# Patient Record
Sex: FEMALE | Race: WHITE | NOT HISPANIC OR LATINO | URBAN - METROPOLITAN AREA
[De-identification: names, ages, dates, MRNs, and addresses within clinical notes are randomized per-mention and may not be internally consistent; named-entity substitution may affect disease eponyms.]

---

## 2017-02-02 ENCOUNTER — OUTPATIENT (OUTPATIENT)
Dept: OUTPATIENT SERVICES | Facility: HOSPITAL | Age: 57
LOS: 1 days | Discharge: HOME | End: 2017-02-02

## 2017-02-02 DIAGNOSIS — C50.919 MALIGNANT NEOPLASM OF UNSPECIFIED SITE OF UNSPECIFIED FEMALE BREAST: ICD-10-CM

## 2017-02-05 ENCOUNTER — RESULT REVIEW (OUTPATIENT)
Age: 57
End: 2017-02-05

## 2017-02-06 ENCOUNTER — LABORATORY RESULT (OUTPATIENT)
Age: 57
End: 2017-02-06

## 2017-02-06 ENCOUNTER — APPOINTMENT (OUTPATIENT)
Dept: HEMATOLOGY ONCOLOGY | Facility: CLINIC | Age: 57
End: 2017-02-06

## 2017-02-06 VITALS
OXYGEN SATURATION: 98 % | SYSTOLIC BLOOD PRESSURE: 137 MMHG | HEIGHT: 62.99 IN | HEART RATE: 73 BPM | RESPIRATION RATE: 16 BRPM | WEIGHT: 185.19 LBS | DIASTOLIC BLOOD PRESSURE: 77 MMHG | BODY MASS INDEX: 32.81 KG/M2 | TEMPERATURE: 98 F

## 2017-02-06 DIAGNOSIS — L53.9 ERYTHEMATOUS CONDITION, UNSPECIFIED: ICD-10-CM

## 2017-02-06 DIAGNOSIS — Z80.3 FAMILY HISTORY OF MALIGNANT NEOPLASM OF BREAST: ICD-10-CM

## 2017-02-06 DIAGNOSIS — Z80.1 FAMILY HISTORY OF MALIGNANT NEOPLASM OF TRACHEA, BRONCHUS AND LUNG: ICD-10-CM

## 2017-02-06 DIAGNOSIS — Z87.891 PERSONAL HISTORY OF NICOTINE DEPENDENCE: ICD-10-CM

## 2017-02-06 LAB
HCT VFR BLD CALC: 42.8 % — SIGNIFICANT CHANGE UP (ref 34.5–45)
HGB BLD-MCNC: 14.1 G/DL — SIGNIFICANT CHANGE UP (ref 11.5–15.5)
MCHC RBC-ENTMCNC: 30.2 PG — SIGNIFICANT CHANGE UP (ref 27–34)
MCHC RBC-ENTMCNC: 33 GM/DL — SIGNIFICANT CHANGE UP (ref 32–36)
MCV RBC AUTO: 91.5 FL — SIGNIFICANT CHANGE UP (ref 80–100)
PLATELET # BLD AUTO: 270 K/UL — SIGNIFICANT CHANGE UP (ref 150–400)
RBC # BLD: 4.68 M/UL — SIGNIFICANT CHANGE UP (ref 3.8–5.2)
RBC # FLD: 11.8 % — SIGNIFICANT CHANGE UP (ref 10.3–14.5)
WBC # BLD: 11.7 K/UL — HIGH (ref 3.8–10.5)
WBC # FLD AUTO: 11.7 K/UL — HIGH (ref 3.8–10.5)

## 2017-02-06 RX ORDER — MIRTAZAPINE 15 MG/1
15 TABLET, FILM COATED ORAL
Qty: 30 | Refills: 1 | Status: ACTIVE | COMMUNITY
Start: 2017-02-06

## 2017-02-07 DIAGNOSIS — C50.912 MALIGNANT NEOPLASM OF UNSPECIFIED SITE OF LEFT FEMALE BREAST: ICD-10-CM

## 2017-02-07 PROBLEM — Z87.891 FORMER SMOKER: Status: ACTIVE | Noted: 2017-02-07

## 2017-02-07 LAB
BASOPHILS # BLD AUTO: 0.1 K/UL — SIGNIFICANT CHANGE UP (ref 0–0.2)
BASOPHILS NFR BLD AUTO: 0.6 % — SIGNIFICANT CHANGE UP (ref 0–2)
EOSINOPHIL # BLD AUTO: 0.6 K/UL — HIGH (ref 0–0.5)
EOSINOPHIL NFR BLD AUTO: 5 % — SIGNIFICANT CHANGE UP (ref 0–6)
LYMPHOCYTES # BLD AUTO: 26.7 % — SIGNIFICANT CHANGE UP (ref 13–44)
LYMPHOCYTES # BLD AUTO: 3.1 K/UL — SIGNIFICANT CHANGE UP (ref 1–3.3)
MONOCYTES # BLD AUTO: 1 K/UL — HIGH (ref 0–0.9)
MONOCYTES NFR BLD AUTO: 8.2 % — SIGNIFICANT CHANGE UP (ref 2–14)
NEUTROPHILS # BLD AUTO: 7 K/UL — SIGNIFICANT CHANGE UP (ref 1.8–7.4)
NEUTROPHILS NFR BLD AUTO: 59.5 % — SIGNIFICANT CHANGE UP (ref 43–77)

## 2017-02-12 ENCOUNTER — RESULT REVIEW (OUTPATIENT)
Age: 57
End: 2017-02-12

## 2017-02-21 ENCOUNTER — RESULT REVIEW (OUTPATIENT)
Age: 57
End: 2017-02-21

## 2017-02-22 ENCOUNTER — APPOINTMENT (OUTPATIENT)
Dept: HEMATOLOGY ONCOLOGY | Facility: CLINIC | Age: 57
End: 2017-02-22

## 2017-02-22 VITALS
BODY MASS INDEX: 33.2 KG/M2 | OXYGEN SATURATION: 94 % | HEART RATE: 80 BPM | RESPIRATION RATE: 16 BRPM | WEIGHT: 187.39 LBS | TEMPERATURE: 99.2 F | SYSTOLIC BLOOD PRESSURE: 120 MMHG | DIASTOLIC BLOOD PRESSURE: 79 MMHG

## 2017-02-22 DIAGNOSIS — R53.83 OTHER FATIGUE: ICD-10-CM

## 2017-02-22 LAB
HCT VFR BLD CALC: 41.8 % — SIGNIFICANT CHANGE UP (ref 34.5–45)
HGB BLD-MCNC: 14.3 G/DL — SIGNIFICANT CHANGE UP (ref 11.5–15.5)
MCHC RBC-ENTMCNC: 30.7 PG — SIGNIFICANT CHANGE UP (ref 27–34)
MCHC RBC-ENTMCNC: 34.3 G/DL — SIGNIFICANT CHANGE UP (ref 32–36)
MCV RBC AUTO: 89.4 FL — SIGNIFICANT CHANGE UP (ref 80–100)
PLATELET # BLD AUTO: 272 K/UL — SIGNIFICANT CHANGE UP (ref 150–400)
RBC # BLD: 4.67 M/UL — SIGNIFICANT CHANGE UP (ref 3.8–5.2)
RBC # FLD: 11.5 % — SIGNIFICANT CHANGE UP (ref 10.3–14.5)
WBC # BLD: 10 K/UL — SIGNIFICANT CHANGE UP (ref 3.8–10.5)
WBC # FLD AUTO: 10 K/UL — SIGNIFICANT CHANGE UP (ref 3.8–10.5)

## 2017-02-23 ENCOUNTER — OTHER (OUTPATIENT)
Age: 57
End: 2017-02-23

## 2017-02-23 LAB
APTT BLD: 33.6 SEC
HAV IGM SER QL: NONREACTIVE
HBV CORE IGM SER QL: NONREACTIVE
HBV SURFACE AG SER QL: NONREACTIVE
HCV AB SER QL: NONREACTIVE
HCV S/CO RATIO: 0.47 S/CO
INR PPP: 1.02 RATIO
PT BLD: 11.5 SEC

## 2017-02-28 ENCOUNTER — APPOINTMENT (OUTPATIENT)
Dept: CV DIAGNOSITCS | Facility: HOSPITAL | Age: 57
End: 2017-02-28

## 2017-02-28 ENCOUNTER — OUTPATIENT (OUTPATIENT)
Dept: OUTPATIENT SERVICES | Facility: HOSPITAL | Age: 57
LOS: 1 days | End: 2017-02-28

## 2017-02-28 DIAGNOSIS — Z17.0 ESTROGEN RECEPTOR POSITIVE STATUS [ER+]: ICD-10-CM

## 2017-02-28 DIAGNOSIS — C50.912 MALIGNANT NEOPLASM OF UNSPECIFIED SITE OF LEFT FEMALE BREAST: ICD-10-CM

## 2017-03-02 ENCOUNTER — APPOINTMENT (OUTPATIENT)
Dept: INFUSION THERAPY | Facility: HOSPITAL | Age: 57
End: 2017-03-02

## 2017-03-02 ENCOUNTER — FORM ENCOUNTER (OUTPATIENT)
Age: 57
End: 2017-03-02

## 2017-03-03 ENCOUNTER — NON-APPOINTMENT (OUTPATIENT)
Age: 57
End: 2017-03-03

## 2017-03-03 ENCOUNTER — OUTPATIENT (OUTPATIENT)
Dept: OUTPATIENT SERVICES | Facility: HOSPITAL | Age: 57
LOS: 1 days | End: 2017-03-03
Payer: MEDICARE

## 2017-03-03 ENCOUNTER — APPOINTMENT (OUTPATIENT)
Dept: CARDIOLOGY | Facility: CLINIC | Age: 57
End: 2017-03-03

## 2017-03-03 VITALS
DIASTOLIC BLOOD PRESSURE: 74 MMHG | BODY MASS INDEX: 32.78 KG/M2 | WEIGHT: 185 LBS | SYSTOLIC BLOOD PRESSURE: 108 MMHG | HEART RATE: 83 BPM | HEIGHT: 63 IN

## 2017-03-03 DIAGNOSIS — C50.912 MALIGNANT NEOPLASM OF UNSPECIFIED SITE OF LEFT FEMALE BREAST: ICD-10-CM

## 2017-03-03 PROCEDURE — 36561 INSERT TUNNELED CV CATH: CPT

## 2017-03-03 PROCEDURE — C1769: CPT

## 2017-03-03 PROCEDURE — 77001 FLUOROGUIDE FOR VEIN DEVICE: CPT | Mod: 26

## 2017-03-03 PROCEDURE — C1788: CPT

## 2017-03-03 PROCEDURE — 77001 FLUOROGUIDE FOR VEIN DEVICE: CPT

## 2017-03-03 PROCEDURE — C1894: CPT

## 2017-03-03 PROCEDURE — 76937 US GUIDE VASCULAR ACCESS: CPT

## 2017-03-03 PROCEDURE — 76937 US GUIDE VASCULAR ACCESS: CPT | Mod: 26

## 2017-03-07 ENCOUNTER — OUTPATIENT (OUTPATIENT)
Dept: OUTPATIENT SERVICES | Facility: HOSPITAL | Age: 57
LOS: 1 days | Discharge: HOME | End: 2017-03-07

## 2017-03-07 DIAGNOSIS — C50.912 MALIGNANT NEOPLASM OF UNSPECIFIED SITE OF LEFT FEMALE BREAST: ICD-10-CM

## 2017-03-08 ENCOUNTER — OTHER (OUTPATIENT)
Age: 57
End: 2017-03-08

## 2017-03-08 ENCOUNTER — RESULT REVIEW (OUTPATIENT)
Age: 57
End: 2017-03-08

## 2017-03-08 DIAGNOSIS — C50.912 MALIGNANT NEOPLASM OF UNSPECIFIED SITE OF LEFT FEMALE BREAST: ICD-10-CM

## 2017-03-08 DIAGNOSIS — Z45.2 ENCOUNTER FOR ADJUSTMENT AND MANAGEMENT OF VASCULAR ACCESS DEVICE: ICD-10-CM

## 2017-03-09 ENCOUNTER — APPOINTMENT (OUTPATIENT)
Dept: INFUSION THERAPY | Facility: HOSPITAL | Age: 57
End: 2017-03-09

## 2017-03-09 LAB
HCT VFR BLD CALC: 36.5 % — SIGNIFICANT CHANGE UP (ref 34.5–45)
HGB BLD-MCNC: 12.7 G/DL — SIGNIFICANT CHANGE UP (ref 11.5–15.5)
MCHC RBC-ENTMCNC: 30.7 PG — SIGNIFICANT CHANGE UP (ref 27–34)
MCHC RBC-ENTMCNC: 34.9 G/DL — SIGNIFICANT CHANGE UP (ref 32–36)
MCV RBC AUTO: 88 FL — SIGNIFICANT CHANGE UP (ref 80–100)
PLATELET # BLD AUTO: 219 K/UL — SIGNIFICANT CHANGE UP (ref 150–400)
RBC # BLD: 4.15 M/UL — SIGNIFICANT CHANGE UP (ref 3.8–5.2)
RBC # FLD: 11.4 % — SIGNIFICANT CHANGE UP (ref 10.3–14.5)
WBC # BLD: 9.9 K/UL — SIGNIFICANT CHANGE UP (ref 3.8–10.5)
WBC # FLD AUTO: 9.9 K/UL — SIGNIFICANT CHANGE UP (ref 3.8–10.5)

## 2017-03-10 DIAGNOSIS — Z51.89 ENCOUNTER FOR OTHER SPECIFIED AFTERCARE: ICD-10-CM

## 2017-03-10 DIAGNOSIS — Z51.11 ENCOUNTER FOR ANTINEOPLASTIC CHEMOTHERAPY: ICD-10-CM

## 2017-03-10 DIAGNOSIS — R11.2 NAUSEA WITH VOMITING, UNSPECIFIED: ICD-10-CM

## 2017-03-22 ENCOUNTER — RESULT REVIEW (OUTPATIENT)
Age: 57
End: 2017-03-22

## 2017-03-23 ENCOUNTER — APPOINTMENT (OUTPATIENT)
Dept: HEMATOLOGY ONCOLOGY | Facility: CLINIC | Age: 57
End: 2017-03-23

## 2017-03-23 ENCOUNTER — LABORATORY RESULT (OUTPATIENT)
Age: 57
End: 2017-03-23

## 2017-03-23 ENCOUNTER — APPOINTMENT (OUTPATIENT)
Dept: INFUSION THERAPY | Facility: HOSPITAL | Age: 57
End: 2017-03-23

## 2017-03-23 VITALS
HEART RATE: 80 BPM | RESPIRATION RATE: 16 BRPM | TEMPERATURE: 99 F | WEIGHT: 189.58 LBS | BODY MASS INDEX: 33.58 KG/M2 | DIASTOLIC BLOOD PRESSURE: 78 MMHG | SYSTOLIC BLOOD PRESSURE: 120 MMHG

## 2017-03-23 LAB
HCT VFR BLD CALC: 36 % — SIGNIFICANT CHANGE UP (ref 34.5–45)
HGB BLD-MCNC: 12.5 G/DL — SIGNIFICANT CHANGE UP (ref 11.5–15.5)
MCHC RBC-ENTMCNC: 30.4 PG — SIGNIFICANT CHANGE UP (ref 27–34)
MCHC RBC-ENTMCNC: 34.8 G/DL — SIGNIFICANT CHANGE UP (ref 32–36)
MCV RBC AUTO: 87.4 FL — SIGNIFICANT CHANGE UP (ref 80–100)
PLATELET # BLD AUTO: 186 K/UL — SIGNIFICANT CHANGE UP (ref 150–400)
RBC # BLD: 4.12 M/UL — SIGNIFICANT CHANGE UP (ref 3.8–5.2)
RBC # FLD: 11.7 % — SIGNIFICANT CHANGE UP (ref 10.3–14.5)
WBC # BLD: 7.7 K/UL — SIGNIFICANT CHANGE UP (ref 3.8–10.5)
WBC # FLD AUTO: 7.7 K/UL — SIGNIFICANT CHANGE UP (ref 3.8–10.5)

## 2017-04-05 ENCOUNTER — RESULT REVIEW (OUTPATIENT)
Age: 57
End: 2017-04-05

## 2017-04-06 ENCOUNTER — LABORATORY RESULT (OUTPATIENT)
Age: 57
End: 2017-04-06

## 2017-04-06 ENCOUNTER — APPOINTMENT (OUTPATIENT)
Dept: CARDIOLOGY | Facility: CLINIC | Age: 57
End: 2017-04-06

## 2017-04-06 ENCOUNTER — NON-APPOINTMENT (OUTPATIENT)
Age: 57
End: 2017-04-06

## 2017-04-06 ENCOUNTER — APPOINTMENT (OUTPATIENT)
Dept: INFUSION THERAPY | Facility: HOSPITAL | Age: 57
End: 2017-04-06

## 2017-04-06 ENCOUNTER — APPOINTMENT (OUTPATIENT)
Dept: HEMATOLOGY ONCOLOGY | Facility: CLINIC | Age: 57
End: 2017-04-06

## 2017-04-06 VITALS
SYSTOLIC BLOOD PRESSURE: 104 MMHG | OXYGEN SATURATION: 95 % | BODY MASS INDEX: 33.31 KG/M2 | HEIGHT: 63 IN | WEIGHT: 188 LBS | HEART RATE: 95 BPM | DIASTOLIC BLOOD PRESSURE: 63 MMHG

## 2017-04-06 DIAGNOSIS — B37.0 CANDIDAL STOMATITIS: ICD-10-CM

## 2017-04-06 DIAGNOSIS — Z86.2 PERSONAL HISTORY OF DISEASES OF THE BLOOD AND BLOOD-FORMING ORGANS AND CERTAIN DISORDERS INVOLVING THE IMMUNE MECHANISM: ICD-10-CM

## 2017-04-06 DIAGNOSIS — M89.8X9 OTHER SPECIFIED DISORDERS OF BONE, UNSPECIFIED SITE: ICD-10-CM

## 2017-04-06 DIAGNOSIS — Z79.899 OTHER LONG TERM (CURRENT) DRUG THERAPY: ICD-10-CM

## 2017-04-06 DIAGNOSIS — D64.9 ANEMIA, UNSPECIFIED: ICD-10-CM

## 2017-04-06 LAB
HCT VFR BLD CALC: 30.9 % — LOW (ref 34.5–45)
HGB BLD-MCNC: 10.6 G/DL — LOW (ref 11.5–15.5)
MCHC RBC-ENTMCNC: 30.1 PG — SIGNIFICANT CHANGE UP (ref 27–34)
MCHC RBC-ENTMCNC: 34.5 G/DL — SIGNIFICANT CHANGE UP (ref 32–36)
MCV RBC AUTO: 87.4 FL — SIGNIFICANT CHANGE UP (ref 80–100)
OB PNL STL: NEGATIVE — SIGNIFICANT CHANGE UP
PLATELET # BLD AUTO: 156 K/UL — SIGNIFICANT CHANGE UP (ref 150–400)
RBC # BLD: 3.53 M/UL — LOW (ref 3.8–5.2)
RBC # FLD: 12.6 % — SIGNIFICANT CHANGE UP (ref 10.3–14.5)
WBC # BLD: 11.6 K/UL — HIGH (ref 3.8–10.5)
WBC # FLD AUTO: 11.6 K/UL — HIGH (ref 3.8–10.5)

## 2017-04-06 RX ORDER — TRIAMTERENE AND HYDROCHLOROTHIAZIDE 25; 37.5 MG/1; MG/1
37.5-25 TABLET ORAL DAILY
Refills: 0 | Status: DISCONTINUED | COMMUNITY
Start: 2017-02-06 | End: 2017-04-06

## 2017-04-06 RX ORDER — IBUPROFEN 200 MG/1
200 TABLET, COATED ORAL
Refills: 0 | Status: DISCONTINUED | COMMUNITY
Start: 2017-02-06 | End: 2017-04-06

## 2017-04-18 ENCOUNTER — OUTPATIENT (OUTPATIENT)
Dept: OUTPATIENT SERVICES | Facility: HOSPITAL | Age: 57
LOS: 1 days | Discharge: HOME | End: 2017-04-18

## 2017-04-18 DIAGNOSIS — C50.912 MALIGNANT NEOPLASM OF UNSPECIFIED SITE OF LEFT FEMALE BREAST: ICD-10-CM

## 2017-04-19 ENCOUNTER — RESULT REVIEW (OUTPATIENT)
Age: 57
End: 2017-04-19

## 2017-04-20 ENCOUNTER — LABORATORY RESULT (OUTPATIENT)
Age: 57
End: 2017-04-20

## 2017-04-20 ENCOUNTER — APPOINTMENT (OUTPATIENT)
Dept: HEMATOLOGY ONCOLOGY | Facility: CLINIC | Age: 57
End: 2017-04-20

## 2017-04-20 ENCOUNTER — APPOINTMENT (OUTPATIENT)
Dept: INFUSION THERAPY | Facility: HOSPITAL | Age: 57
End: 2017-04-20

## 2017-04-20 VITALS — TEMPERATURE: 97.8 F | HEART RATE: 67 BPM

## 2017-04-20 VITALS — OXYGEN SATURATION: 97 %

## 2017-04-20 VITALS — DIASTOLIC BLOOD PRESSURE: 67 MMHG | SYSTOLIC BLOOD PRESSURE: 103 MMHG

## 2017-04-20 LAB
ANISOCYTOSIS BLD QL: SIGNIFICANT CHANGE UP
BASOPHILS # BLD AUTO: 0.1 K/UL — SIGNIFICANT CHANGE UP (ref 0–0.2)
EOSINOPHIL # BLD AUTO: 0.2 K/UL — SIGNIFICANT CHANGE UP (ref 0–0.5)
HCT VFR BLD CALC: 26.9 % — LOW (ref 34.5–45)
HGB BLD-MCNC: 9.3 G/DL — LOW (ref 11.5–15.5)
LYMPHOCYTES # BLD AUTO: 1.2 K/UL — SIGNIFICANT CHANGE UP (ref 1–3.3)
LYMPHOCYTES # BLD AUTO: 3 % — LOW (ref 13–44)
MACROCYTES BLD QL: SIGNIFICANT CHANGE UP
MCHC RBC-ENTMCNC: 29.8 PG — SIGNIFICANT CHANGE UP (ref 27–34)
MCHC RBC-ENTMCNC: 34.6 G/DL — SIGNIFICANT CHANGE UP (ref 32–36)
MCV RBC AUTO: 86 FL — SIGNIFICANT CHANGE UP (ref 80–100)
METAMYELOCYTES # FLD: 9 % — HIGH (ref 0–0)
MONOCYTES # BLD AUTO: 1.2 K/UL — HIGH (ref 0–0.9)
MONOCYTES NFR BLD AUTO: 6 % — SIGNIFICANT CHANGE UP (ref 2–14)
NEUTROPHILS # BLD AUTO: 16.4 K/UL — HIGH (ref 1.8–7.4)
NEUTROPHILS NFR BLD AUTO: 74 % — SIGNIFICANT CHANGE UP (ref 43–77)
NEUTS BAND # BLD: 7 % — SIGNIFICANT CHANGE UP (ref 0–8)
PLAT MORPH BLD: NORMAL — SIGNIFICANT CHANGE UP
PLATELET # BLD AUTO: 200 K/UL — SIGNIFICANT CHANGE UP (ref 150–400)
POLYCHROMASIA BLD QL SMEAR: SIGNIFICANT CHANGE UP
PROMYELOCYTES # FLD: 1 % — HIGH (ref 0–0)
RBC # BLD: 3.12 M/UL — LOW (ref 3.8–5.2)
RBC # FLD: 13.9 % — SIGNIFICANT CHANGE UP (ref 10.3–14.5)
RBC BLD AUTO: SIGNIFICANT CHANGE UP
WBC # BLD: 19.1 K/UL — HIGH (ref 3.8–10.5)
WBC # FLD AUTO: 19.1 K/UL — HIGH (ref 3.8–10.5)

## 2017-04-21 DIAGNOSIS — R11.2 NAUSEA WITH VOMITING, UNSPECIFIED: ICD-10-CM

## 2017-04-21 DIAGNOSIS — Z51.11 ENCOUNTER FOR ANTINEOPLASTIC CHEMOTHERAPY: ICD-10-CM

## 2017-04-21 DIAGNOSIS — Z51.89 ENCOUNTER FOR OTHER SPECIFIED AFTERCARE: ICD-10-CM

## 2017-05-01 VITALS
OXYGEN SATURATION: 97 % | RESPIRATION RATE: 18 BRPM | SYSTOLIC BLOOD PRESSURE: 103 MMHG | HEART RATE: 67 BPM | DIASTOLIC BLOOD PRESSURE: 67 MMHG | TEMPERATURE: 97.8 F

## 2017-05-11 ENCOUNTER — APPOINTMENT (OUTPATIENT)
Dept: CARDIOLOGY | Facility: CLINIC | Age: 57
End: 2017-05-11

## 2017-05-18 ENCOUNTER — APPOINTMENT (OUTPATIENT)
Dept: CV DIAGNOSITCS | Facility: HOSPITAL | Age: 57
End: 2017-05-18

## 2017-05-18 ENCOUNTER — OUTPATIENT (OUTPATIENT)
Dept: OUTPATIENT SERVICES | Facility: HOSPITAL | Age: 57
LOS: 1 days | End: 2017-05-18
Payer: COMMERCIAL

## 2017-05-18 DIAGNOSIS — C50.919 MALIGNANT NEOPLASM OF UNSPECIFIED SITE OF UNSPECIFIED FEMALE BREAST: ICD-10-CM

## 2017-05-18 PROCEDURE — 93306 TTE W/DOPPLER COMPLETE: CPT | Mod: 26

## 2017-05-18 PROCEDURE — C8929: CPT

## 2017-06-19 ENCOUNTER — CLINICAL ADVICE (OUTPATIENT)
Age: 57
End: 2017-06-19

## 2017-06-21 ENCOUNTER — CLINICAL ADVICE (OUTPATIENT)
Age: 57
End: 2017-06-21

## 2017-06-23 ENCOUNTER — OUTPATIENT (OUTPATIENT)
Dept: OUTPATIENT SERVICES | Facility: HOSPITAL | Age: 57
LOS: 1 days | Discharge: HOME | End: 2017-06-23

## 2017-07-07 ENCOUNTER — OUTPATIENT (OUTPATIENT)
Dept: OUTPATIENT SERVICES | Facility: HOSPITAL | Age: 57
LOS: 1 days | Discharge: HOME | End: 2017-07-07

## 2017-07-07 DIAGNOSIS — C50.912 MALIGNANT NEOPLASM OF UNSPECIFIED SITE OF LEFT FEMALE BREAST: ICD-10-CM

## 2017-07-12 ENCOUNTER — APPOINTMENT (OUTPATIENT)
Dept: HEMATOLOGY ONCOLOGY | Facility: CLINIC | Age: 57
End: 2017-07-12
Payer: MEDICARE

## 2017-07-12 ENCOUNTER — RESULT REVIEW (OUTPATIENT)
Age: 57
End: 2017-07-12

## 2017-07-12 VITALS
HEIGHT: 62.99 IN | SYSTOLIC BLOOD PRESSURE: 99 MMHG | OXYGEN SATURATION: 94 % | TEMPERATURE: 97.7 F | WEIGHT: 180.12 LBS | HEART RATE: 84 BPM | BODY MASS INDEX: 31.91 KG/M2 | DIASTOLIC BLOOD PRESSURE: 68 MMHG | RESPIRATION RATE: 16 BRPM

## 2017-07-12 DIAGNOSIS — Z45.2 ENCOUNTER FOR ADJUSTMENT AND MANAGEMENT OF VASCULAR ACCESS DEVICE: ICD-10-CM

## 2017-07-12 DIAGNOSIS — E78.5 HYPERLIPIDEMIA, UNSPECIFIED: ICD-10-CM

## 2017-07-12 DIAGNOSIS — D68.9 COAGULATION DEFECT, UNSPECIFIED: ICD-10-CM

## 2017-07-12 DIAGNOSIS — Z00.00 ENCOUNTER FOR GENERAL ADULT MEDICAL EXAMINATION W/OUT ABNORMAL FINDINGS: ICD-10-CM

## 2017-07-12 DIAGNOSIS — R73.9 HYPERGLYCEMIA, UNSPECIFIED: ICD-10-CM

## 2017-07-12 DIAGNOSIS — G62.9 POLYNEUROPATHY, UNSPECIFIED: ICD-10-CM

## 2017-07-12 DIAGNOSIS — Z31.5 ENCOUNTER FOR PROCREATIVE GENETIC COUNSELING: ICD-10-CM

## 2017-07-12 LAB
ALBUMIN SERPL ELPH-MCNC: 4.2 G/DL
ALP BLD-CCNC: 110 U/L
ALT SERPL-CCNC: 20 U/L
ANION GAP SERPL CALC-SCNC: 13 MMOL/L
APTT BLD: 33.1 SEC
AST SERPL-CCNC: 17 U/L
BILIRUB SERPL-MCNC: <0.2 MG/DL
BUN SERPL-MCNC: 19 MG/DL
CALCIUM SERPL-MCNC: 10 MG/DL
CHLORIDE SERPL-SCNC: 97 MMOL/L
CHOLEST SERPL-MCNC: 196 MG/DL
CHOLEST/HDLC SERPL: 4.5 RATIO
CO2 SERPL-SCNC: 29 MMOL/L
CREAT SERPL-MCNC: 0.9 MG/DL
GLUCOSE SERPL-MCNC: 206 MG/DL
HCT VFR BLD CALC: 41.6 % — SIGNIFICANT CHANGE UP (ref 34.5–45)
HDLC SERPL-MCNC: 44 MG/DL
HGB BLD-MCNC: 14.3 G/DL — SIGNIFICANT CHANGE UP (ref 11.5–15.5)
INR PPP: 1.01 RATIO
LDLC SERPL CALC-MCNC: 109 MG/DL
MCHC RBC-ENTMCNC: 30.1 PG — SIGNIFICANT CHANGE UP (ref 27–34)
MCHC RBC-ENTMCNC: 34.4 G/DL — SIGNIFICANT CHANGE UP (ref 32–36)
MCV RBC AUTO: 87.5 FL — SIGNIFICANT CHANGE UP (ref 80–100)
PLATELET # BLD AUTO: 265 K/UL — SIGNIFICANT CHANGE UP (ref 150–400)
POTASSIUM SERPL-SCNC: 5 MMOL/L
PROT SERPL-MCNC: 7.9 G/DL
PT BLD: 11.4 SEC
RBC # BLD: 4.75 M/UL — SIGNIFICANT CHANGE UP (ref 3.8–5.2)
RBC # FLD: 12.8 % — SIGNIFICANT CHANGE UP (ref 10.3–14.5)
SODIUM SERPL-SCNC: 139 MMOL/L
TRIGL SERPL-MCNC: 215 MG/DL
WBC # BLD: 10.9 K/UL — HIGH (ref 3.8–10.5)
WBC # FLD AUTO: 10.9 K/UL — HIGH (ref 3.8–10.5)

## 2017-07-12 PROCEDURE — 99215 OFFICE O/P EST HI 40 MIN: CPT

## 2017-07-12 RX ORDER — CLOTRIMAZOLE 10 MG/1
10 LOZENGE ORAL
Qty: 50 | Refills: 6 | Status: DISCONTINUED | COMMUNITY
Start: 2017-04-06 | End: 2017-07-12

## 2017-07-12 RX ORDER — METOCLOPRAMIDE 10 MG/1
10 TABLET ORAL
Qty: 30 | Refills: 1 | Status: DISCONTINUED | COMMUNITY
Start: 2017-03-08 | End: 2017-07-12

## 2017-07-12 RX ORDER — DOXORUBICIN HYDROCHLORIDE 2 MG/ML
2 INJECTION, SOLUTION INTRAVENOUS
Refills: 0 | Status: DISCONTINUED | COMMUNITY
Start: 2017-04-06 | End: 2017-07-12

## 2017-07-12 RX ORDER — DEXAMETHASONE 4 MG/1
4 TABLET ORAL DAILY
Qty: 24 | Refills: 1 | Status: DISCONTINUED | COMMUNITY
Start: 2017-03-08 | End: 2017-07-12

## 2017-07-12 RX ORDER — APREPITANT 80 MG/1
80 CAPSULE ORAL
Qty: 2 | Refills: 1 | Status: DISCONTINUED | COMMUNITY
Start: 2017-03-08 | End: 2017-07-12

## 2017-07-12 RX ORDER — APREPITANT 80 MG/1
80 CAPSULE ORAL
Refills: 0 | Status: DISCONTINUED | COMMUNITY
Start: 2017-04-06 | End: 2017-07-12

## 2017-07-13 ENCOUNTER — MESSAGE (OUTPATIENT)
Age: 57
End: 2017-07-13

## 2017-07-13 ENCOUNTER — APPOINTMENT (OUTPATIENT)
Dept: RADIATION ONCOLOGY | Facility: CLINIC | Age: 57
End: 2017-07-13

## 2017-07-13 VITALS
HEIGHT: 62.99 IN | HEART RATE: 105 BPM | BODY MASS INDEX: 32.25 KG/M2 | TEMPERATURE: 98.5 F | RESPIRATION RATE: 16 BRPM | OXYGEN SATURATION: 90 % | DIASTOLIC BLOOD PRESSURE: 80 MMHG | WEIGHT: 182 LBS | SYSTOLIC BLOOD PRESSURE: 117 MMHG

## 2017-07-13 DIAGNOSIS — C50.919 MALIGNANT NEOPLASM OF UNSPECIFIED SITE OF UNSPECIFIED FEMALE BREAST: ICD-10-CM

## 2017-07-13 DIAGNOSIS — G89.29 DORSALGIA, UNSPECIFIED: ICD-10-CM

## 2017-07-13 DIAGNOSIS — R60.0 LOCALIZED EDEMA: ICD-10-CM

## 2017-07-13 DIAGNOSIS — M54.9 DORSALGIA, UNSPECIFIED: ICD-10-CM

## 2017-07-13 RX ORDER — LIDOCAINE AND PRILOCAINE 2.5 %-2.5%
2.5-2.5 KIT TOPICAL
Qty: 1 | Refills: 1 | Status: DISCONTINUED | COMMUNITY
Start: 2017-03-08 | End: 2017-07-13

## 2017-07-24 LAB
BASOPHILS # BLD AUTO: 0 K/UL — SIGNIFICANT CHANGE UP (ref 0–0.2)
BASOPHILS NFR BLD AUTO: 0.5 % — SIGNIFICANT CHANGE UP (ref 0–2)
EOSINOPHIL # BLD AUTO: 0.3 K/UL — SIGNIFICANT CHANGE UP (ref 0–0.5)
EOSINOPHIL NFR BLD AUTO: 3 % — SIGNIFICANT CHANGE UP (ref 0–6)
LYMPHOCYTES # BLD AUTO: 1.6 K/UL — SIGNIFICANT CHANGE UP (ref 1–3.3)
LYMPHOCYTES # BLD AUTO: 15.1 % — SIGNIFICANT CHANGE UP (ref 13–44)
MONOCYTES # BLD AUTO: 0.8 K/UL — SIGNIFICANT CHANGE UP (ref 0–0.9)
MONOCYTES NFR BLD AUTO: 7.8 % — SIGNIFICANT CHANGE UP (ref 2–14)
NEUTROPHILS # BLD AUTO: 8 K/UL — HIGH (ref 1.8–7.4)
NEUTROPHILS NFR BLD AUTO: 73.6 % — SIGNIFICANT CHANGE UP (ref 43–77)

## 2017-07-25 ENCOUNTER — RESULT REVIEW (OUTPATIENT)
Age: 57
End: 2017-07-25

## 2017-07-28 ENCOUNTER — CLINICAL ADVICE (OUTPATIENT)
Age: 57
End: 2017-07-28

## 2017-07-31 ENCOUNTER — FORM ENCOUNTER (OUTPATIENT)
Age: 57
End: 2017-07-31

## 2017-08-01 ENCOUNTER — OUTPATIENT (OUTPATIENT)
Dept: OUTPATIENT SERVICES | Facility: HOSPITAL | Age: 57
LOS: 1 days | End: 2017-08-01
Payer: MEDICARE

## 2017-08-01 DIAGNOSIS — C50.919 MALIGNANT NEOPLASM OF UNSPECIFIED SITE OF UNSPECIFIED FEMALE BREAST: ICD-10-CM

## 2017-08-01 PROCEDURE — 36590 REMOVAL TUNNELED CV CATH: CPT

## 2017-08-04 DIAGNOSIS — C50.919 MALIGNANT NEOPLASM OF UNSPECIFIED SITE OF UNSPECIFIED FEMALE BREAST: ICD-10-CM

## 2017-08-04 DIAGNOSIS — Z45.2 ENCOUNTER FOR ADJUSTMENT AND MANAGEMENT OF VASCULAR ACCESS DEVICE: ICD-10-CM

## 2017-08-07 ENCOUNTER — OUTPATIENT (OUTPATIENT)
Dept: OUTPATIENT SERVICES | Facility: HOSPITAL | Age: 57
LOS: 1 days | End: 2017-08-07
Payer: MEDICARE

## 2017-08-07 VITALS
HEIGHT: 63 IN | DIASTOLIC BLOOD PRESSURE: 74 MMHG | SYSTOLIC BLOOD PRESSURE: 114 MMHG | RESPIRATION RATE: 12 BRPM | OXYGEN SATURATION: 97 % | HEART RATE: 70 BPM | WEIGHT: 179.9 LBS | TEMPERATURE: 98 F

## 2017-08-07 DIAGNOSIS — Z85.3 PERSONAL HISTORY OF MALIGNANT NEOPLASM OF BREAST: ICD-10-CM

## 2017-08-07 DIAGNOSIS — Z98.890 OTHER SPECIFIED POSTPROCEDURAL STATES: Chronic | ICD-10-CM

## 2017-08-07 DIAGNOSIS — Z90.13 ACQUIRED ABSENCE OF BILATERAL BREASTS AND NIPPLES: Chronic | ICD-10-CM

## 2017-08-07 DIAGNOSIS — Z01.818 ENCOUNTER FOR OTHER PREPROCEDURAL EXAMINATION: ICD-10-CM

## 2017-08-07 PROCEDURE — 85027 COMPLETE CBC AUTOMATED: CPT

## 2017-08-07 PROCEDURE — 80048 BASIC METABOLIC PNL TOTAL CA: CPT

## 2017-08-07 PROCEDURE — G0463: CPT

## 2017-08-07 RX ORDER — SODIUM CHLORIDE 9 MG/ML
3 INJECTION INTRAMUSCULAR; INTRAVENOUS; SUBCUTANEOUS EVERY 8 HOURS
Qty: 0 | Refills: 0 | Status: DISCONTINUED | OUTPATIENT
Start: 2017-08-10 | End: 2017-08-25

## 2017-08-07 RX ORDER — CELECOXIB 200 MG/1
200 CAPSULE ORAL ONCE
Qty: 0 | Refills: 0 | Status: COMPLETED | OUTPATIENT
Start: 2017-08-10 | End: 2017-08-10

## 2017-08-07 RX ORDER — ACETAMINOPHEN 500 MG
975 TABLET ORAL ONCE
Qty: 0 | Refills: 0 | Status: COMPLETED | OUTPATIENT
Start: 2017-08-10 | End: 2017-08-10

## 2017-08-07 RX ORDER — APREPITANT 80 MG/1
40 CAPSULE ORAL ONCE
Qty: 0 | Refills: 0 | Status: COMPLETED | OUTPATIENT
Start: 2017-08-10 | End: 2017-08-10

## 2017-08-07 RX ORDER — LIDOCAINE HCL 20 MG/ML
0.2 VIAL (ML) INJECTION ONCE
Qty: 0 | Refills: 0 | Status: DISCONTINUED | OUTPATIENT
Start: 2017-08-10 | End: 2017-08-25

## 2017-08-07 NOTE — H&P PST ADULT - NSANTHOSAYNRD_GEN_A_CORE
No. ASHLEE screening performed.  STOP BANG Legend: 0-2 = LOW Risk; 3-4 = INTERMEDIATE Risk; 5-8 = HIGH Risk

## 2017-08-07 NOTE — H&P PST ADULT - PMH
Breast cancer  10/2016  Depression    Neuropathy involving both lower extremities  pinched nerve back

## 2017-08-07 NOTE — H&P PST ADULT - HISTORY OF PRESENT ILLNESS
56yr old female with hx of breast cancer coming in for revision of bilateral breast  reconstruction with implants

## 2017-08-10 ENCOUNTER — RESULT REVIEW (OUTPATIENT)
Age: 57
End: 2017-08-10

## 2017-08-10 ENCOUNTER — OUTPATIENT (OUTPATIENT)
Dept: OUTPATIENT SERVICES | Facility: HOSPITAL | Age: 57
LOS: 1 days | End: 2017-08-10
Payer: MEDICARE

## 2017-08-10 VITALS
OXYGEN SATURATION: 98 % | TEMPERATURE: 97 F | RESPIRATION RATE: 16 BRPM | HEART RATE: 75 BPM | SYSTOLIC BLOOD PRESSURE: 117 MMHG | DIASTOLIC BLOOD PRESSURE: 73 MMHG

## 2017-08-10 VITALS
HEIGHT: 63 IN | DIASTOLIC BLOOD PRESSURE: 69 MMHG | HEART RATE: 68 BPM | TEMPERATURE: 98 F | OXYGEN SATURATION: 98 % | SYSTOLIC BLOOD PRESSURE: 103 MMHG | RESPIRATION RATE: 18 BRPM | WEIGHT: 179.9 LBS

## 2017-08-10 DIAGNOSIS — Z90.13 ACQUIRED ABSENCE OF BILATERAL BREASTS AND NIPPLES: Chronic | ICD-10-CM

## 2017-08-10 DIAGNOSIS — Z85.3 PERSONAL HISTORY OF MALIGNANT NEOPLASM OF BREAST: ICD-10-CM

## 2017-08-10 DIAGNOSIS — Z98.890 OTHER SPECIFIED POSTPROCEDURAL STATES: Chronic | ICD-10-CM

## 2017-08-10 PROCEDURE — 14301 TIS TRNFR ANY 30.1-60 SQ CM: CPT

## 2017-08-10 PROCEDURE — 88304 TISSUE EXAM BY PATHOLOGIST: CPT

## 2017-08-10 PROCEDURE — 88304 TISSUE EXAM BY PATHOLOGIST: CPT | Mod: 26

## 2017-08-10 PROCEDURE — C1789: CPT

## 2017-08-10 PROCEDURE — 19342 INSJ/RPLCMT BRST IMPLT SEP D: CPT | Mod: 50

## 2017-08-10 RX ORDER — OXYCODONE HYDROCHLORIDE 5 MG/1
5 TABLET ORAL ONCE
Qty: 0 | Refills: 0 | Status: DISCONTINUED | OUTPATIENT
Start: 2017-08-10 | End: 2017-08-10

## 2017-08-10 RX ORDER — CELECOXIB 200 MG/1
200 CAPSULE ORAL ONCE
Qty: 0 | Refills: 0 | Status: DISCONTINUED | OUTPATIENT
Start: 2017-08-10 | End: 2017-08-25

## 2017-08-10 RX ORDER — CEFAZOLIN SODIUM 1 G
2000 VIAL (EA) INJECTION ONCE
Qty: 0 | Refills: 0 | Status: COMPLETED | OUTPATIENT
Start: 2017-08-10 | End: 2017-08-10

## 2017-08-10 RX ORDER — SODIUM CHLORIDE 9 MG/ML
1000 INJECTION, SOLUTION INTRAVENOUS
Qty: 0 | Refills: 0 | Status: DISCONTINUED | OUTPATIENT
Start: 2017-08-10 | End: 2017-08-25

## 2017-08-10 RX ORDER — OXYCODONE HYDROCHLORIDE 5 MG/1
10 TABLET ORAL ONCE
Qty: 0 | Refills: 0 | Status: DISCONTINUED | OUTPATIENT
Start: 2017-08-10 | End: 2017-08-10

## 2017-08-10 RX ORDER — ONDANSETRON 8 MG/1
4 TABLET, FILM COATED ORAL ONCE
Qty: 0 | Refills: 0 | Status: COMPLETED | OUTPATIENT
Start: 2017-08-10 | End: 2017-08-10

## 2017-08-10 RX ADMIN — Medication 975 MILLIGRAM(S): at 06:16

## 2017-08-10 RX ADMIN — CELECOXIB 200 MILLIGRAM(S): 200 CAPSULE ORAL at 06:17

## 2017-08-10 RX ADMIN — OXYCODONE HYDROCHLORIDE 10 MILLIGRAM(S): 5 TABLET ORAL at 09:45

## 2017-08-10 RX ADMIN — ONDANSETRON 4 MILLIGRAM(S): 8 TABLET, FILM COATED ORAL at 09:44

## 2017-08-10 RX ADMIN — APREPITANT 40 MILLIGRAM(S): 80 CAPSULE ORAL at 06:16

## 2017-08-15 LAB — SURGICAL PATHOLOGY STUDY: SIGNIFICANT CHANGE UP

## 2017-08-18 ENCOUNTER — TRANSCRIPTION ENCOUNTER (OUTPATIENT)
Age: 57
End: 2017-08-18

## 2017-09-01 ENCOUNTER — OUTPATIENT (OUTPATIENT)
Dept: OUTPATIENT SERVICES | Facility: HOSPITAL | Age: 57
LOS: 1 days | Discharge: HOME | End: 2017-09-01

## 2017-09-01 DIAGNOSIS — Z98.890 OTHER SPECIFIED POSTPROCEDURAL STATES: Chronic | ICD-10-CM

## 2017-09-01 DIAGNOSIS — Z90.13 ACQUIRED ABSENCE OF BILATERAL BREASTS AND NIPPLES: Chronic | ICD-10-CM

## 2017-09-01 DIAGNOSIS — C50.912 MALIGNANT NEOPLASM OF UNSPECIFIED SITE OF LEFT FEMALE BREAST: ICD-10-CM

## 2017-09-07 ENCOUNTER — APPOINTMENT (OUTPATIENT)
Dept: HEMATOLOGY ONCOLOGY | Facility: CLINIC | Age: 57
End: 2017-09-07

## 2017-10-16 VITALS
TEMPERATURE: 98 F | RESPIRATION RATE: 16 BRPM | WEIGHT: 186 LBS | HEART RATE: 100 BPM | SYSTOLIC BLOOD PRESSURE: 110 MMHG | BODY MASS INDEX: 32.96 KG/M2 | HEIGHT: 62.99 IN | DIASTOLIC BLOOD PRESSURE: 60 MMHG | OXYGEN SATURATION: 90 %

## 2017-10-23 VITALS
HEIGHT: 62.99 IN | WEIGHT: 185.6 LBS | BODY MASS INDEX: 32.89 KG/M2 | DIASTOLIC BLOOD PRESSURE: 72 MMHG | SYSTOLIC BLOOD PRESSURE: 102 MMHG | OXYGEN SATURATION: 95 % | TEMPERATURE: 97.7 F | RESPIRATION RATE: 16 BRPM | HEART RATE: 87 BPM

## 2017-10-30 VITALS
DIASTOLIC BLOOD PRESSURE: 60 MMHG | SYSTOLIC BLOOD PRESSURE: 100 MMHG | BODY MASS INDEX: 32.78 KG/M2 | HEART RATE: 81 BPM | WEIGHT: 185 LBS | RESPIRATION RATE: 16 BRPM | HEIGHT: 62.99 IN | OXYGEN SATURATION: 92 % | TEMPERATURE: 98.4 F

## 2017-11-06 VITALS
TEMPERATURE: 97.9 F | WEIGHT: 184.4 LBS | SYSTOLIC BLOOD PRESSURE: 110 MMHG | HEART RATE: 74 BPM | HEIGHT: 60 IN | BODY MASS INDEX: 36.2 KG/M2 | RESPIRATION RATE: 16 BRPM | OXYGEN SATURATION: 96 % | DIASTOLIC BLOOD PRESSURE: 60 MMHG

## 2017-11-13 VITALS
HEIGHT: 60 IN | WEIGHT: 182 LBS | OXYGEN SATURATION: 96 % | DIASTOLIC BLOOD PRESSURE: 70 MMHG | BODY MASS INDEX: 35.73 KG/M2 | HEART RATE: 84 BPM | RESPIRATION RATE: 16 BRPM | SYSTOLIC BLOOD PRESSURE: 110 MMHG | TEMPERATURE: 98 F

## 2017-11-20 VITALS
WEIGHT: 178 LBS | OXYGEN SATURATION: 93 % | HEIGHT: 60 IN | RESPIRATION RATE: 16 BRPM | DIASTOLIC BLOOD PRESSURE: 60 MMHG | SYSTOLIC BLOOD PRESSURE: 120 MMHG | TEMPERATURE: 97.6 F | BODY MASS INDEX: 34.95 KG/M2 | HEART RATE: 80 BPM

## 2017-12-27 ENCOUNTER — APPOINTMENT (OUTPATIENT)
Dept: RADIATION ONCOLOGY | Facility: CLINIC | Age: 57
End: 2017-12-27

## 2019-04-10 ENCOUNTER — OUTPATIENT (OUTPATIENT)
Dept: OUTPATIENT SERVICES | Facility: HOSPITAL | Age: 59
LOS: 1 days | Discharge: HOME | End: 2019-04-10

## 2019-04-10 DIAGNOSIS — Z90.13 ACQUIRED ABSENCE OF BILATERAL BREASTS AND NIPPLES: Chronic | ICD-10-CM

## 2019-04-10 DIAGNOSIS — Z98.890 OTHER SPECIFIED POSTPROCEDURAL STATES: Chronic | ICD-10-CM

## 2019-04-10 DIAGNOSIS — C50.912 MALIGNANT NEOPLASM OF UNSPECIFIED SITE OF LEFT FEMALE BREAST: ICD-10-CM

## 2019-04-10 PROBLEM — F32.9 MAJOR DEPRESSIVE DISORDER, SINGLE EPISODE, UNSPECIFIED: Chronic | Status: ACTIVE | Noted: 2017-08-07

## 2019-04-10 PROBLEM — G57.93 UNSPECIFIED MONONEUROPATHY OF BILATERAL LOWER LIMBS: Chronic | Status: ACTIVE | Noted: 2017-08-07

## 2019-04-10 PROBLEM — C50.919 MALIGNANT NEOPLASM OF UNSPECIFIED SITE OF UNSPECIFIED FEMALE BREAST: Chronic | Status: ACTIVE | Noted: 2017-08-07

## 2019-04-11 ENCOUNTER — APPOINTMENT (OUTPATIENT)
Dept: HEMATOLOGY ONCOLOGY | Facility: CLINIC | Age: 59
End: 2019-04-11
Payer: MEDICARE

## 2019-04-11 VITALS
SYSTOLIC BLOOD PRESSURE: 94 MMHG | HEART RATE: 84 BPM | DIASTOLIC BLOOD PRESSURE: 63 MMHG | TEMPERATURE: 98.7 F | BODY MASS INDEX: 33.2 KG/M2 | RESPIRATION RATE: 15 BRPM | WEIGHT: 170 LBS | OXYGEN SATURATION: 94 %

## 2019-04-11 PROCEDURE — 99214 OFFICE O/P EST MOD 30 MIN: CPT

## 2019-04-11 RX ORDER — LORAZEPAM 0.5 MG/1
0.5 TABLET ORAL
Qty: 30 | Refills: 0 | Status: DISCONTINUED | COMMUNITY
Start: 2017-03-08 | End: 2019-04-11

## 2019-04-11 RX ORDER — TRIAMTERENE AND HYDROCHLOROTHIAZIDE 37.5; 25 MG/1; MG/1
37.5-25 CAPSULE ORAL
Refills: 0 | Status: DISCONTINUED | COMMUNITY
Start: 2017-07-12 | End: 2019-04-11

## 2019-04-11 RX ORDER — VARENICLINE TARTRATE 0.5 (11)-1
0.5 MG X 11 & KIT ORAL
Refills: 0 | Status: DISCONTINUED | COMMUNITY
Start: 2017-02-06 | End: 2019-04-11

## 2019-04-11 RX ORDER — SILVER SULFADIAZINE 10 MG/G
1 CREAM TOPICAL
Qty: 400 | Refills: 1 | Status: DISCONTINUED | COMMUNITY
Start: 2017-10-30 | End: 2019-04-11

## 2019-05-27 NOTE — PHYSICAL EXAM
[Restricted in physically strenuous activity but ambulatory and able to carry out work of a light or sedentary nature] : Status 1- Restricted in physically strenuous activity but ambulatory and able to carry out work of a light or sedentary nature, e.g., light house work, office work [Obese] : obese [Normal] : normal appearance, no rash, nodules, vesicles, ulcers, erythema [Ulcers] : no ulcers [Mucositis] : no mucositis [de-identified] :  Hoarse. [de-identified] : Bilateral mastectomies with saline expanders, asymmetry, right expander larger than left. No chest wall masses.

## 2019-05-27 NOTE — HISTORY OF PRESENT ILLNESS
[Disease: _____________________] : Disease: [unfilled] [T: ___] : T[unfilled] [N: ___] : N[unfilled] [M: ___] : M[unfilled] [AJCC Stage: ____] : AJCC Stage: [unfilled] [de-identified] : The patient presented at age 56 with a left breast  mass she palpated on breast self-examination in October 2016.\par \par Ultrasound-guided core biopsy of the mass on November 2, 2016 demonstrated infiltrating ductal carcinoma, Delbert score 9/9, which was ER positive (99.42%), [place] positive (78.19%) Ki-67  53.77% and HER-2/aimee negative (1+). There was also a cyst at the 3:00 position of the left breast which was aspirated and which demonstrated benign cytology.\par \par Breast MRI on November 8, 2016 demonstrated scattered foci of enhancement within 0.5 cm focus in the right breast for which biopsy was recommended. MRI guided biopsy on November 21, 2016 demonstrated intraductal papilloma.\par \par Dr. Jerald Coleman performed bilateral mastectomies, left axillary lymph node dissection, and right sentinel lymph node biopsy followed by placement of saline expanders by Dr. Mic Mack on 12/29/2016..Pathology from the left breast demonstrated a 4 cm infiltrating ductal carcinoma, [place] score 8/9 without lymphovascular invasion. There was also DCIS within the specimen comprising less than 5% of the tumor volume. One of 2 sentinel lymph nodes was positive for a 1.1 cm metastasis with extranodal extension. An additional 15 axillary lymph nodes were removed, one of which demonstrated a micrometastasis (0.15 cm) and another which demonstrated isolated tumor cells. Pathology from the right breast demonstrated intraductal papilloma and LCIS. There was no invasive carcinoma. There were 2 negative sentinel lymph nodes and one negative non-sentinel lymph node on the right. Oncotype DX recurrence score was 21.\par \par  [de-identified] : Infiltrating ductal carcinoma, Delbert score 8-9/9, ER positive, [place] positive, HER-2/aimee negative [FreeTextEntry1] : AC X 4  3/9/2017 - 4/20/2017 [de-identified] : The patient has been 2  years  4 months  post diagnosis of left breast cancer.\par \par The patient  completed her 4th cycle of AC on 4/20/2017.\par \par Case discussed at prospective breast tumor board on 7/10/2017 and it was felt she could potentially benefit from adjuvant RT.\par \par The patient was last seen in this office 1 year  9 months ago (0712/2017).The patient states after she completed RT, she  had a sudden death (nephew) in the family and she was also hospitalized for treatment of foot infection. She was also caring for her father in law who had a stroke and also demented. The patient  state she  also lost her medical coverage at that time but it  was later reinstated. \par \par Last saw her neurologist Dr London  2017 who felt she has severe neuropathy and should not receive taxane therapy. The patient states she has not been back since the last time she saw her neurologist..The patient states she was advised to return PRN.\par \par The patient  feels she  initiated Anastrozole 11/2017,(unsure as to  when she originally initiated  Anastrozole).\par A seven months worth of Anastrozole (# 30  with 6 refills) was prescribed for patient when she last seen 7/12/2017. However the patient has not called the office for additional refills. The patient states her PCP Dr Kevin Mills has been giving her refills.Patient claimed she still has #90 with a refill left. \par \par The patient states fatigue has resolved completely,  feels she is  back to her baseline performance status.\par \par Had Echo 5/18/2017. Was told it was OK but report states it was difficult to evaluate wall motion and EF.\par \par The patient initiated RT to left  breast 10/[id number]  and completed 11/202017. Last saw radiation oncologist Karmen Dangelo 11/20/2017.\par \par Last saw her PCP Dr Kevin Mills 04/08/2019 for her yearly physical, exam with benign findings.The patient states she had labs drawn during this exam, therefore  refusing labs to be drawn today.\par \par Last saw breast surgeon Dr Coleman  11/2017, exam OK.\par \par Last saw plastic surgeon Dr Oquendo 12/2017 after she saw her breast surgeon, exam OK.\par \par Today BP 94/63 NIBP (RUE), patient asymptomatic. Manual repeat 15 minutes later 108/65 RUE.\par \par Overall the patient states he feels well since last seen.\par \par No other interval event since last seen.\par \par

## 2019-05-27 NOTE — REVIEW OF SYSTEMS
[Insomnia] : insomnia [Depression] : depression [Negative] : Allergic/Immunologic [Anxiety] : no anxiety [FreeTextEntry2] : Energy level is good,  back to baseline. Cannot do strenuous activity. [FreeTextEntry3] : Years ago [FreeTextEntry5] : Most recent echocardiogram 5/18/2017, see interval history. [FreeTextEntry7] : Never had colonoscopy. Had endoscopy 11/2017 which demonstrated ulcer. Constipation has improved from opioids. [FreeTextEntry8] : Most recent GYN exam 2014. Vaginal pain s/p implant Denies  vaginal spotting/bleeding. [FreeTextEntry9] : Never had baseline BMD. Occasional  lower back pain from  disc compression, last saw orthopedic(spine) Dr Ward 07/2016. [de-identified] : Dry skin. [de-identified] : Neuropathy up to mid calf, still has difficulty walking up stairs, cannot feel stairs. [de-identified] : Insomnia with much  improvement, sleeping through the night now

## 2019-05-27 NOTE — REASON FOR VISIT
[Follow-Up Visit] : a follow-up [Other: _____] : [unfilled] [FreeTextEntry2] : Infiltrating ductal carcinoma left breast.

## 2019-07-08 ENCOUNTER — OUTPATIENT (OUTPATIENT)
Dept: OUTPATIENT SERVICES | Facility: HOSPITAL | Age: 59
LOS: 1 days | Discharge: HOME | End: 2019-07-08

## 2019-07-08 DIAGNOSIS — Z90.13 ACQUIRED ABSENCE OF BILATERAL BREASTS AND NIPPLES: Chronic | ICD-10-CM

## 2019-07-08 DIAGNOSIS — Z98.890 OTHER SPECIFIED POSTPROCEDURAL STATES: Chronic | ICD-10-CM

## 2019-07-08 DIAGNOSIS — C50.912 MALIGNANT NEOPLASM OF UNSPECIFIED SITE OF LEFT FEMALE BREAST: ICD-10-CM

## 2019-07-11 ENCOUNTER — APPOINTMENT (OUTPATIENT)
Dept: HEMATOLOGY ONCOLOGY | Facility: CLINIC | Age: 59
End: 2019-07-11
Payer: MEDICARE

## 2019-07-11 ENCOUNTER — RESULT REVIEW (OUTPATIENT)
Age: 59
End: 2019-07-11

## 2019-07-11 VITALS
DIASTOLIC BLOOD PRESSURE: 62 MMHG | OXYGEN SATURATION: 93 % | BODY MASS INDEX: 34.66 KG/M2 | SYSTOLIC BLOOD PRESSURE: 98 MMHG | RESPIRATION RATE: 16 BRPM | HEART RATE: 70 BPM | TEMPERATURE: 98.2 F | WEIGHT: 177.47 LBS

## 2019-07-11 DIAGNOSIS — Z79.811 LONG TERM (CURRENT) USE OF AROMATASE INHIBITORS: ICD-10-CM

## 2019-07-11 DIAGNOSIS — M25.619 STIFFNESS OF UNSPECIFIED SHOULDER, NOT ELSEWHERE CLASSIFIED: ICD-10-CM

## 2019-07-11 LAB
ALBUMIN SERPL ELPH-MCNC: 3.9 G/DL
ALP BLD-CCNC: 82 U/L
ALT SERPL-CCNC: 11 U/L
ANION GAP SERPL CALC-SCNC: 9 MMOL/L
AST SERPL-CCNC: 14 U/L
BILIRUB SERPL-MCNC: 0.2 MG/DL
BUN SERPL-MCNC: 17 MG/DL
CALCIUM SERPL-MCNC: 8.9 MG/DL
CHLORIDE SERPL-SCNC: 102 MMOL/L
CO2 SERPL-SCNC: 27 MMOL/L
CREAT SERPL-MCNC: 0.88 MG/DL
GLUCOSE SERPL-MCNC: 91 MG/DL
HCT VFR BLD CALC: 44.2 % — SIGNIFICANT CHANGE UP (ref 34.5–45)
HGB BLD-MCNC: 14.1 G/DL — SIGNIFICANT CHANGE UP (ref 11.5–15.5)
MCHC RBC-ENTMCNC: 29.7 PG — SIGNIFICANT CHANGE UP (ref 27–34)
MCHC RBC-ENTMCNC: 31.9 G/DL — LOW (ref 32–36)
MCV RBC AUTO: 93 FL — SIGNIFICANT CHANGE UP (ref 80–100)
PLATELET # BLD AUTO: 243 K/UL — SIGNIFICANT CHANGE UP (ref 150–400)
POTASSIUM SERPL-SCNC: 4.9 MMOL/L
PROT SERPL-MCNC: 6.4 G/DL
RBC # BLD: 4.75 M/UL — SIGNIFICANT CHANGE UP (ref 3.8–5.2)
RBC # FLD: 13.5 % — SIGNIFICANT CHANGE UP (ref 10.3–14.5)
SODIUM SERPL-SCNC: 138 MMOL/L
WBC # BLD: 9.6 K/UL — SIGNIFICANT CHANGE UP (ref 3.8–10.5)
WBC # FLD AUTO: 9.6 K/UL — SIGNIFICANT CHANGE UP (ref 3.8–10.5)

## 2019-07-11 PROCEDURE — 99215 OFFICE O/P EST HI 40 MIN: CPT

## 2019-07-11 RX ORDER — PANTOPRAZOLE SODIUM 20 MG/1
TABLET, DELAYED RELEASE ORAL
Refills: 0 | Status: ACTIVE | COMMUNITY

## 2019-07-12 PROBLEM — M25.619: Status: ACTIVE | Noted: 2019-07-11

## 2019-07-12 NOTE — PHYSICAL EXAM
[Restricted in physically strenuous activity but ambulatory and able to carry out work of a light or sedentary nature] : Status 1- Restricted in physically strenuous activity but ambulatory and able to carry out work of a light or sedentary nature, e.g., light house work, office work [Obese] : obese [Normal] : affect appropriate [Ulcers] : no ulcers [Mucositis] : no mucositis [de-identified] : Mld stasis changes both legs without edema [de-identified] : Bilateral mastectomies with silicone implants. No chest wall mass..

## 2019-07-12 NOTE — REVIEW OF SYSTEMS
[Insomnia] : insomnia [Depression] : depression [Negative] : Heme/Lymph [Anxiety] : no anxiety [FreeTextEntry3] : Years ago. [FreeTextEntry2] : Energy level is good,  back to baseline. Cannot do strenuous activity. Had flu vaccinated in fall 2018. [FreeTextEntry5] : Most recent echocardiogram 5/18/2017. [FreeTextEntry7] : Never had colonoscopy. Had endoscopy 11/2018 which demonstrated ulcer.  [FreeTextEntry8] : Most recent GYN exam 2014. Occasional vaginal pain Denies  vaginal spotting/bleeding. [FreeTextEntry9] : Never had baseline BMD. Occasional  lower back pain from  disc compression, last saw orthopedic(spine) Dr Ward 07/2016. [de-identified] : Dry skin. [de-identified] : Unchanged neuropathy up to mid calf, still has difficulty walking up stairs, cannot feel stairs. [de-identified] : Insomnia with much  improvement, sleeping through the night now

## 2019-07-12 NOTE — HISTORY OF PRESENT ILLNESS
[Disease: _____________________] : Disease: [unfilled] [T: ___] : T[unfilled] [N: ___] : N[unfilled] [M: ___] : M[unfilled] [AJCC Stage: ____] : AJCC Stage: [unfilled] [de-identified] : The patient presented at age 56 with a left breast  mass she palpated on breast self-examination in October 2016.\par \par Ultrasound-guided core biopsy of the mass on November 2, 2016 demonstrated infiltrating ductal carcinoma, Delbert score 9/9, which was ER positive (99.42%), [place] positive (78.19%) Ki-67  53.77% and HER-2/aimee negative (1+). There was also a cyst at the 3:00 position of the left breast which was aspirated and which demonstrated benign cytology.\par \par Breast MRI on November 8, 2016 demonstrated scattered foci of enhancement within 0.5 cm focus in the right breast for which biopsy was recommended. MRI guided biopsy on November 21, 2016 demonstrated intraductal papilloma.\par \par Dr. Jerald Coleman performed bilateral mastectomies, left axillary lymph node dissection, and right sentinel lymph node biopsy followed by placement of saline expanders by Dr. Mic Mack on 12/29/2016..Pathology from the left breast demonstrated a 4 cm infiltrating ductal carcinoma, [place] score 8/9 without lymphovascular invasion. There was also DCIS within the specimen comprising less than 5% of the tumor volume. One of 2 sentinel lymph nodes was positive for a 1.1 cm metastasis with extranodal extension. An additional 15 axillary lymph nodes were removed, one of which demonstrated a micrometastasis (0.15 cm) and another which demonstrated isolated tumor cells. Pathology from the right breast demonstrated intraductal papilloma and LCIS. There was no invasive carcinoma. There were 2 negative sentinel lymph nodes and one negative non-sentinel lymph node on the right. Oncotype DX recurrence score was 21.\par \par  [de-identified] : Infiltrating ductal carcinoma, Delbert score 8-9/9, ER positive, [place] positive, HER-2/aimee negative [FreeTextEntry1] : AC X 4  3/9/2017 - 4/20/2017 [de-identified] : The patient has been 2  years  7 months  post diagnosis of left breast cancer.\par \par The patient  completed chemotherapy 2 years 3 months ago.\par \par Patient sates she received RT RT at [facility] under the supervision of Dr uQijano who is no longer on the staff there.\par \par Neuropathy varies, overall no significant change.\par \par The patient  feels she  initiated Anastrozole 11/2017,and that she has been taking it regularly. Dr Kevin Mills confirmed he was refilling it for her during the 1 year 9 month hiatus between her visits her (the patient cancelled her appointments and did not reschedule despite repeated attempts to have her schedule follow up). finally returned for follow up and was seen here by Lisy Cabello NP on 4/11/2019.\par \par States she saw Dr Coleman last year.\par \par The patient initiated RT to left  breast 10/[id number]  and completed 11/202017. Last saw radiation oncologist Karmen Dangelo 11/20/2017.\par \par Last saw her PCP Dr Kevin Mills 07/01/2019 for her q monthly f/u, exam with benign findings.\par \par Last saw plastic surgeon Dr Oquendo 12/2017 after she saw her breast surgeon, exam OK.\par \par Was admitted to [facility]  in June 2018 with cellulitis both legs and again for 1 day with vomiting  11/2018 and was told she had an ulcer. \par \par Overall the patient states he feels well since last seen.\par \par No other interval event since last seen.\par \par

## 2020-01-23 ENCOUNTER — OUTPATIENT (OUTPATIENT)
Dept: OUTPATIENT SERVICES | Facility: HOSPITAL | Age: 60
LOS: 1 days | Discharge: HOME | End: 2020-01-23

## 2020-01-23 ENCOUNTER — APPOINTMENT (OUTPATIENT)
Dept: HEMATOLOGY ONCOLOGY | Facility: CLINIC | Age: 60
End: 2020-01-23
Payer: MEDICARE

## 2020-01-23 VITALS
WEIGHT: 175.05 LBS | DIASTOLIC BLOOD PRESSURE: 82 MMHG | BODY MASS INDEX: 34.19 KG/M2 | TEMPERATURE: 98.7 F | SYSTOLIC BLOOD PRESSURE: 133 MMHG | HEART RATE: 73 BPM | RESPIRATION RATE: 17 BRPM | OXYGEN SATURATION: 94 %

## 2020-01-23 DIAGNOSIS — C50.912 MALIGNANT NEOPLASM OF UNSPECIFIED SITE OF LEFT FEMALE BREAST: ICD-10-CM

## 2020-01-23 DIAGNOSIS — Z98.82 BREAST IMPLANT STATUS: ICD-10-CM

## 2020-01-23 DIAGNOSIS — F32.9 MAJOR DEPRESSIVE DISORDER, SINGLE EPISODE, UNSPECIFIED: ICD-10-CM

## 2020-01-23 DIAGNOSIS — Z79.899 OTHER LONG TERM (CURRENT) DRUG THERAPY: ICD-10-CM

## 2020-01-23 DIAGNOSIS — F17.200 NICOTINE DEPENDENCE, UNSPECIFIED, UNCOMPLICATED: ICD-10-CM

## 2020-01-23 DIAGNOSIS — Z17.0 MALIGNANT NEOPLASM OF UNSPECIFIED SITE OF LEFT FEMALE BREAST: ICD-10-CM

## 2020-01-23 DIAGNOSIS — Z90.13 ACQUIRED ABSENCE OF BILATERAL BREASTS AND NIPPLES: Chronic | ICD-10-CM

## 2020-01-23 DIAGNOSIS — Z98.890 OTHER SPECIFIED POSTPROCEDURAL STATES: Chronic | ICD-10-CM

## 2020-01-23 PROCEDURE — 99215 OFFICE O/P EST HI 40 MIN: CPT

## 2020-01-23 RX ORDER — ANASTROZOLE TABLETS 1 MG/1
1 TABLET ORAL DAILY
Qty: 90 | Refills: 3 | Status: ACTIVE | COMMUNITY
Start: 2017-07-12 | End: 1900-01-01

## 2020-01-23 NOTE — PHYSICAL EXAM
[Restricted in physically strenuous activity but ambulatory and able to carry out work of a light or sedentary nature] : Status 1- Restricted in physically strenuous activity but ambulatory and able to carry out work of a light or sedentary nature, e.g., light house work, office work [Obese] : obese [Normal] : affect appropriate [Ulcers] : no ulcers [Mucositis] : no mucositis [de-identified] : Patient smells like cigarettes. [de-identified] : Mld stasis changes both legs without edema [de-identified] : Bilateral mastectomies with silicone implants. No chest wall mass..

## 2020-01-23 NOTE — HISTORY OF PRESENT ILLNESS
[Disease: _____________________] : Disease: [unfilled] [T: ___] : T[unfilled] [N: ___] : N[unfilled] [AJCC Stage: ____] : AJCC Stage: [unfilled] [M: ___] : M[unfilled] [de-identified] : The patient presented at age 56 with a left breast  mass she palpated on breast self-examination in October 2016.\par \par Ultrasound-guided core biopsy of the mass on November 2, 2016 demonstrated infiltrating ductal carcinoma, Delbert score 9/9, which was ER positive (99.42%), [place] positive (78.19%) Ki-67  53.77% and HER-2/aimee negative (1+). There was also a cyst at the 3:00 position of the left breast which was aspirated and which demonstrated benign cytology.\par \par Breast MRI on November 8, 2016 demonstrated scattered foci of enhancement within 0.5 cm focus in the right breast for which biopsy was recommended. MRI guided biopsy on November 21, 2016 demonstrated intraductal papilloma.\par \par Dr. Jerald Coleman performed bilateral mastectomies, left axillary lymph node dissection, and right sentinel lymph node biopsy followed by placement of saline expanders by Dr. Mic Mack on 12/29/2016..Pathology from the left breast demonstrated a 4 cm infiltrating ductal carcinoma, [place] score 8/9 without lymphovascular invasion. There was also DCIS within the specimen comprising less than 5% of the tumor volume. One of 2 sentinel lymph nodes was positive for a 1.1 cm metastasis with extranodal extension. An additional 15 axillary lymph nodes were removed, one of which demonstrated a micrometastasis (0.15 cm) and another which demonstrated isolated tumor cells. Pathology from the right breast demonstrated intraductal papilloma and LCIS. There was no invasive carcinoma. There were 2 negative sentinel lymph nodes and one negative non-sentinel lymph node on the right. Oncotype DX recurrence score was 21.\par \par The patient received 4 cycles of AC chemotherapy from 3/9/2017 - 4/20/2017. Her cumulative dose of Adriamycin was 440 mg (240mg/m2). Dose dense paclitaxel was planned, however the patient had a severe pre-existing peripheral neuropathy and it was the strong opinion of her neurologist Dr eJthro London that she not be treated with drugs which could exacerbate her neuropathy.\par \par The patient did not return for follow up despite multiple calls to get her in. She returned on 7/12/2017 and was started on anastrozole and advised to schedule a radiation medicine consultation\par \par After multiple delays on the patient part she was seen by Dr Karmen Quijano in radiation medicine.The patient initiated RT to left  breast 10/[id number]  and completed it 11/202017.\par \par The patient was again lost to follow up despite many attempts to reach her. She returned to the [facility] on 4/11/2019 with the history of having continued anastrozole which was refilled by her PCP Dr Kevin Mills. This was subsequently confirmed by a call to Dr Mills.\par  [de-identified] : Infiltrating ductal carcinoma, Delbert score 8-9/9, ER positive, [place] positive, HER-2/aimee negative [FreeTextEntry1] : AC X 4  3/9/2017 - 4/20/2017 Cumulative dose of Adriamycin  440 mg (240mg/m2)\par anastrozole start 7/12/2017 [de-identified] : The patient has been 3  years  2  months  post diagnosis of left breast cancer.\par \par The patient  completed chemotherapy 2 years 9  months ago.\par \par The patient has been taking anastrozole for 2 years 6 months.\par \par Ongoing neuropathy being treated by her PCP Dr Mills. Sees Dr London in neurology as needed. Neuropathy is NOT related to her cancer treatment.\par \par States she saw Dr Coleman last year.\par \par Last saw her PCP Dr Kevin Mills 12/20/2019, scheduled for labs on 1/28/202020.\par \par Last saw plastic surgeon Dr Oquendo 12/2017 after she saw her breast surgeon, exam OK.\par \par Persistent forgetfulness.\par \par No other interval event since last seen.\par \par

## 2020-01-23 NOTE — REVIEW OF SYSTEMS
[Depression] : depression [Negative] : Allergic/Immunologic [Insomnia] : no insomnia [Anxiety] : no anxiety [FreeTextEntry2] : Energy level is "terrible". Cannot do strenuous activity./Did not have flu vaccination yet, plans to have it done 1/28/2020. [FreeTextEntry3] : Years ago. [FreeTextEntry5] : Most recent echocardiogram 5/18/2017. [FreeTextEntry7] : Never had colonoscopy. Had endoscopy 11/2018 which demonstrated ulcer.  [FreeTextEntry8] : Most recent GYN exam 2014. Occasional vaginal pain Denies  vaginal spotting/bleeding. [de-identified] : Unchanged neuropathy up to mid calf, still has difficulty walking up stairs, cannot feel stairs. [FreeTextEntry9] : Never had baseline BMD. Occasional  lower back pain from  disc compression,unchanged.

## 2020-01-23 NOTE — REASON FOR VISIT
[Other: _____] : [unfilled] [Follow-Up Visit] : a follow-up [FreeTextEntry2] : Infiltrating ductal carcinoma left breast.

## 2020-07-16 ENCOUNTER — OUTPATIENT (OUTPATIENT)
Dept: OUTPATIENT SERVICES | Facility: HOSPITAL | Age: 60
LOS: 1 days | Discharge: HOME | End: 2020-07-16

## 2020-07-16 DIAGNOSIS — C50.912 MALIGNANT NEOPLASM OF UNSPECIFIED SITE OF LEFT FEMALE BREAST: ICD-10-CM

## 2020-07-16 DIAGNOSIS — Z98.890 OTHER SPECIFIED POSTPROCEDURAL STATES: Chronic | ICD-10-CM

## 2020-07-16 DIAGNOSIS — Z90.13 ACQUIRED ABSENCE OF BILATERAL BREASTS AND NIPPLES: Chronic | ICD-10-CM

## 2020-07-21 ENCOUNTER — APPOINTMENT (OUTPATIENT)
Dept: HEMATOLOGY ONCOLOGY | Facility: CLINIC | Age: 60
End: 2020-07-21

## 2020-07-21 NOTE — PHYSICAL EXAM
[Restricted in physically strenuous activity but ambulatory and able to carry out work of a light or sedentary nature] : Status 1- Restricted in physically strenuous activity but ambulatory and able to carry out work of a light or sedentary nature, e.g., light house work, office work [Obese] : obese [Normal] : affect appropriate [Ulcers] : no ulcers [de-identified] : Patient smells like cigarettes. [Mucositis] : no mucositis [de-identified] : Mld stasis changes both legs without edema [de-identified] : Bilateral mastectomies with silicone implants. No chest wall mass..

## 2020-07-21 NOTE — REVIEW OF SYSTEMS
[Depression] : depression [Negative] : Allergic/Immunologic [Insomnia] : no insomnia [FreeTextEntry2] : Energy level is "terrible". Cannot do strenuous activity./Did not have flu vaccination yet, plans to have it done 1/28/2020. [Anxiety] : no anxiety [FreeTextEntry3] : Years ago. [FreeTextEntry8] : Most recent GYN exam 2014. Occasional vaginal pain Denies  vaginal spotting/bleeding. [FreeTextEntry7] : Never had colonoscopy. Had endoscopy 11/2018 which demonstrated ulcer.  [FreeTextEntry5] : Most recent echocardiogram 5/18/2017. [FreeTextEntry9] : Never had baseline BMD. Occasional  lower back pain from  disc compression,unchanged. [de-identified] : Unchanged neuropathy up to mid calf, still has difficulty walking up stairs, cannot feel stairs.

## 2020-07-21 NOTE — HISTORY OF PRESENT ILLNESS
[Disease: _____________________] : Disease: [unfilled] [T: ___] : T[unfilled] [N: ___] : N[unfilled] [M: ___] : M[unfilled] [AJCC Stage: ____] : AJCC Stage: [unfilled] [de-identified] : The patient presented at age 56 with a left breast  mass she palpated on breast self-examination in October 2016.\par \par Ultrasound-guided core biopsy of the mass on November 2, 2016 demonstrated infiltrating ductal carcinoma, Delbert score 9/9, which was ER positive (99.42%), [place] positive (78.19%) Ki-67  53.77% and HER-2/aimee negative (1+). There was also a cyst at the 3:00 position of the left breast which was aspirated and which demonstrated benign cytology.\par \par Breast MRI on November 8, 2016 demonstrated scattered foci of enhancement within 0.5 cm focus in the right breast for which biopsy was recommended. MRI guided biopsy on November 21, 2016 demonstrated intraductal papilloma.\par \par Dr. Jerald Coleman performed bilateral mastectomies, left axillary lymph node dissection, and right sentinel lymph node biopsy followed by placement of saline expanders by Dr. Mic Mack on 12/29/2016..Pathology from the left breast demonstrated a 4 cm infiltrating ductal carcinoma, [place] score 8/9 without lymphovascular invasion. There was also DCIS within the specimen comprising less than 5% of the tumor volume. One of 2 sentinel lymph nodes was positive for a 1.1 cm metastasis with extranodal extension. An additional 15 axillary lymph nodes were removed, one of which demonstrated a micrometastasis (0.15 cm) and another which demonstrated isolated tumor cells. Pathology from the right breast demonstrated intraductal papilloma and LCIS. There was no invasive carcinoma. There were 2 negative sentinel lymph nodes and one negative non-sentinel lymph node on the right. Oncotype DX recurrence score was 21.\par \par The patient received 4 cycles of AC chemotherapy from 3/9/2017 - 4/20/2017. Her cumulative dose of Adriamycin was 440 mg (240mg/m2). Dose dense paclitaxel was planned, however the patient had a severe pre-existing peripheral neuropathy and it was the strong opinion of her neurologist Dr Jethro London that she not be treated with drugs which could exacerbate her neuropathy.\par \par The patient did not return for follow up despite multiple calls to get her in. She returned on 7/12/2017 and was started on anastrozole and advised to schedule a radiation medicine consultation\par \par After multiple delays on the patient part she was seen by Dr Karmen Quijano in radiation medicine.The patient initiated RT to left  breast 10/[id number]  and completed it 11/202017.\par \par The patient was again lost to follow up despite many attempts to reach her. She returned to the [facility] on 4/11/2019 with the history of having continued anastrozole which was refilled by her PCP Dr Kevin Mills. This was subsequently confirmed by a call to Dr Mills.\par  [de-identified] : Infiltrating ductal carcinoma, Delbert score 8-9/9, ER positive, [place] positive, HER-2/aimee negative [FreeTextEntry1] : AC X 4  3/9/2017 - 4/20/2017 Cumulative dose of Adriamycin  440 mg (240mg/m2)\par anastrozole start 7/12/2017 [de-identified] : The patient has been 3  years  8  months  post diagnosis of left breast cancer.\par \par The patient  completed chemotherapy 3  years 3  months ago.\par \par The patient has been taking anastrozole for 3  years.\par \par Ongoing neuropathy being treated by her PCP Dr Mills. Sees Dr London in neurology as needed. Neuropathy is NOT related to her cancer treatment.--------------------\par \par States she saw Dr Coleman last year.------------------\par \par Last saw her PCP Dr Kevin Mills 12/20/2019, scheduled for labs on 1/28/202020.---------------------------\par \par Last saw plastic surgeon Dr Oquendo 12/2017 after she saw her breast surgeon, exam OK.------------------\par \par Persistent forgetfulness.\par \par No other interval event since last seen.\par \par

## 2021-12-23 ENCOUNTER — OUTPATIENT (OUTPATIENT)
Dept: OUTPATIENT SERVICES | Facility: HOSPITAL | Age: 61
LOS: 1 days | End: 2021-12-23
Payer: COMMERCIAL

## 2021-12-23 DIAGNOSIS — Z98.890 OTHER SPECIFIED POSTPROCEDURAL STATES: Chronic | ICD-10-CM

## 2021-12-23 DIAGNOSIS — Z20.828 CONTACT WITH AND (SUSPECTED) EXPOSURE TO OTHER VIRAL COMMUNICABLE DISEASES: ICD-10-CM

## 2021-12-23 DIAGNOSIS — Z90.13 ACQUIRED ABSENCE OF BILATERAL BREASTS AND NIPPLES: Chronic | ICD-10-CM

## 2021-12-23 LAB — SARS-COV-2 RNA SPEC QL NAA+PROBE: SIGNIFICANT CHANGE UP

## 2021-12-23 PROCEDURE — U0003: CPT

## 2021-12-23 PROCEDURE — C9803: CPT

## 2021-12-23 PROCEDURE — U0005: CPT

## 2021-12-24 DIAGNOSIS — Z20.828 CONTACT WITH AND (SUSPECTED) EXPOSURE TO OTHER VIRAL COMMUNICABLE DISEASES: ICD-10-CM

## 2023-12-07 ENCOUNTER — APPOINTMENT (OUTPATIENT)
Dept: ORTHOPEDIC SURGERY | Facility: CLINIC | Age: 63
End: 2023-12-07
Payer: OTHER MISCELLANEOUS

## 2023-12-07 VITALS
WEIGHT: 175 LBS | HEIGHT: 60 IN | BODY MASS INDEX: 34.36 KG/M2 | DIASTOLIC BLOOD PRESSURE: 77 MMHG | HEART RATE: 66 BPM | OXYGEN SATURATION: 94 % | SYSTOLIC BLOOD PRESSURE: 119 MMHG

## 2023-12-07 DIAGNOSIS — M40.204 UNSPECIFIED KYPHOSIS, THORACIC REGION: ICD-10-CM

## 2023-12-07 PROCEDURE — 72083 X-RAY EXAM ENTIRE SPI 4/5 VW: CPT

## 2023-12-07 PROCEDURE — 99204 OFFICE O/P NEW MOD 45 MIN: CPT

## 2024-02-20 NOTE — DISCUSSION/SUMMARY
[de-identified] : A lengthy discussion was had with the patient regarding her condition.  Continue with regular exercise and walking program.  Rx PT.  F/u after in office. The patient's questions were sought and answered satisfactorily.  IMaryuri NP am acting as a scribe for Dr. Frank Schwab.

## 2024-02-20 NOTE — PHYSICAL EXAM
[de-identified] : NVI.  +TTP axial thoracic spine.  + thoracic kyphosis. [de-identified] : Scoliosis xray with lumbar dynamic views 12/7/2023: increased thoracic kyphosis, 80 degrees  MRI C/L 3/2022: disc degeneration C6-7, no spinal stenosis MRI T spine 9/8/2023: no spinal cord compression, increased kyphosis

## 2024-04-18 ENCOUNTER — APPOINTMENT (OUTPATIENT)
Dept: ORTHOPEDIC SURGERY | Facility: CLINIC | Age: 64
End: 2024-04-18